# Patient Record
Sex: FEMALE | Race: WHITE | NOT HISPANIC OR LATINO
[De-identification: names, ages, dates, MRNs, and addresses within clinical notes are randomized per-mention and may not be internally consistent; named-entity substitution may affect disease eponyms.]

---

## 2020-11-23 DIAGNOSIS — Z01.818 ENCOUNTER FOR OTHER PREPROCEDURAL EXAMINATION: ICD-10-CM

## 2020-11-23 PROBLEM — Z00.00 ENCOUNTER FOR PREVENTIVE HEALTH EXAMINATION: Status: ACTIVE | Noted: 2020-11-23

## 2020-11-27 ENCOUNTER — APPOINTMENT (OUTPATIENT)
Dept: DISASTER EMERGENCY | Facility: CLINIC | Age: 65
End: 2020-11-27

## 2020-11-28 LAB — SARS-COV-2 N GENE NPH QL NAA+PROBE: NOT DETECTED

## 2024-05-20 ENCOUNTER — APPOINTMENT (OUTPATIENT)
Dept: ORTHOPEDIC SURGERY | Facility: CLINIC | Age: 69
End: 2024-05-20
Payer: MEDICARE

## 2024-05-20 VITALS — WEIGHT: 180 LBS | BODY MASS INDEX: 33.13 KG/M2 | HEIGHT: 62 IN

## 2024-05-20 DIAGNOSIS — I10 ESSENTIAL (PRIMARY) HYPERTENSION: ICD-10-CM

## 2024-05-20 DIAGNOSIS — Z78.9 OTHER SPECIFIED HEALTH STATUS: ICD-10-CM

## 2024-05-20 PROCEDURE — 99204 OFFICE O/P NEW MOD 45 MIN: CPT

## 2024-05-20 PROCEDURE — 72100 X-RAY EXAM L-S SPINE 2/3 VWS: CPT

## 2024-05-20 RX ORDER — SPIRONOLACTONE 25 MG/1
25 TABLET ORAL
Refills: 0 | Status: ACTIVE | COMMUNITY

## 2024-05-20 RX ORDER — MULTIVIT-MIN/FA/LYCOPEN/LUTEIN .4-300-25
TABLET ORAL
Refills: 0 | Status: ACTIVE | COMMUNITY

## 2024-05-20 RX ORDER — TELMISARTAN 80 MG/1
80 TABLET ORAL
Refills: 0 | Status: ACTIVE | COMMUNITY

## 2024-05-20 RX ORDER — AMLODIPINE BESYLATE 10 MG/1
10 TABLET ORAL
Refills: 0 | Status: ACTIVE | COMMUNITY

## 2024-05-21 NOTE — DISCUSSION/SUMMARY
[de-identified] : 68-year-old female has chronic low back pain attributed to grade 1-2 spondylolisthesis at the L4/5 level. Today's X-ray findings were reviewed. The initial treatment plan involves starting physical therapy to focus on strengthening, stretching, and improving range of motion. Additionally, she will follow up with pain management for possible interventional injections. An updated MRI of the lumbar spine has been requested since her last study was conducted four years ago; this new MRI is necessary before proceeding with pain management injections. If her symptoms persist despite conservative care, she may be considered a candidate for surgical options such as laminectomy/fusion as a last resort. A follow-up appointment is scheduled in six weeks. Cumulative encounter duration exceeded 45 minutes.  Prior to appointment and during encounter with patient extensive medical records were reviewed including but not limited to, hospital records, outpatient records, imaging results, and lab data. During this appointment the patient was examined, diagnoses were discussed and explained in a face to face manner. In addition extensive time was spent reviewing aforementioned diagnostic studies. Counseling including abnormal image results, differential diagnoses, treatment options, risk and benefits, lifestyle changes, current condition, and current medications was performed. Patient's comments, questions, and concerns were addressed and patient verbalized understanding. Based on this patient's presentation at our office, which is an orthopedic spine surgeon's office, this patient inherently / intrinsically has a risk, however minute, of developing issues such as Cauda equina syndrome, bowel and bladder changes, or progression of motor or neurological deficits such as paralysis which may be permanent.  NARA YOUSSEF Acting as a Scribe for Dr. Jelani MCMAHON, Nara Youssef, attest that this documentation has been prepared under the direction and in the presence of Provider Bebo Palomares MD.

## 2024-05-21 NOTE — HISTORY OF PRESENT ILLNESS
[10] : 10 [2] : 2 [Shooting] : shooting [Heat] : heat [Walking] : walking [Retired] : Work status: retired [de-identified] : 05/20/2024 - 68-year-old female presenting for initial assessment of long-standing lower back pain, last checked in 2020, with no leg pain reported. Her back pain intensifies when she stands or walks and has been worsening gradually over the past few years, without recent treatments. She's been coping by stretching and using a stationary bike as much as she can. However, she's noticed that her back pain has started to limit her activities, like needing a shopping cart at the store, though she still considers herself functional. Last mri 4 yrs old. Outside of high BP, no abnormalities reported to general medical health.  [] : no [FreeTextEntry9] : advil, aleve [de-identified] : orthopedic, pain mgmt  [de-identified] : mri l-spine done at [facility]

## 2024-05-30 ENCOUNTER — APPOINTMENT (OUTPATIENT)
Dept: MRI IMAGING | Facility: CLINIC | Age: 69
End: 2024-05-30
Payer: MEDICARE

## 2024-05-30 PROCEDURE — 72148 MRI LUMBAR SPINE W/O DYE: CPT

## 2024-06-11 ENCOUNTER — APPOINTMENT (OUTPATIENT)
Dept: PAIN MANAGEMENT | Facility: CLINIC | Age: 69
End: 2024-06-11
Payer: MEDICARE

## 2024-06-11 VITALS — WEIGHT: 186 LBS | BODY MASS INDEX: 34.23 KG/M2 | HEIGHT: 62 IN

## 2024-06-11 DIAGNOSIS — M43.16 SPONDYLOLISTHESIS, LUMBAR REGION: ICD-10-CM

## 2024-06-11 DIAGNOSIS — M48.062 SPINAL STENOSIS, LUMBAR REGION WITH NEUROGENIC CLAUDICATION: ICD-10-CM

## 2024-06-11 DIAGNOSIS — M54.16 RADICULOPATHY, LUMBAR REGION: ICD-10-CM

## 2024-06-11 PROCEDURE — 99204 OFFICE O/P NEW MOD 45 MIN: CPT

## 2024-06-11 NOTE — PHYSICAL EXAM
[de-identified] : Constitutional:   - No acute distress   - Well developed; well nourished    Neurological:   - normal mood and affect   - alert and oriented x 3     Cardiovascular:   - grossly normal   Lumbar Spine Exam:   Inspection: erythema (-) ecchymosis (-) rashes (-) alignment: no scoliosis   Palpation: Midline lumbar tenderness:            (-) midline thoracic tenderness:          (-) Lumbar paraspinal tenderness:  L (+) ; R (+) thoracic paraspinal tenderness: L (-) ; R (-) sciatic nerve tenderness :          L (-) ; R (-) SI joint tenderness:                     L (-) ; R (-) GTB tenderness:                        L (-);  R (-)   ROM: WNL no pain throughout ROM testing   Strength:                                    Right       Left    Hip Flexion:                (5/5)       (5/5) Quadriceps:               (5/5)       (5/5) Hamstrings:                (5/5)       (5/5) Ankle Dorsiflexion:     (5/5)       (5/5) EHL:                           (5/5)       (5/5) Ankle Plantarflexion:  (5/5)       (5/5)   Special Tests: SLR:                            R (=) ; L (=) Facet loading:             R (-) ; L (-) JACKELYN test:                R (N/A) ; L (N/A) Hamstring tightness:   R (-);  L (-)   Neurologic: SILT throughout right lower extremity SILT throughout left lower extremity   Reflexes normal and symmetric bilateral lower extremities   Gait: non- antalgic gait ambulates without assistive device

## 2024-06-11 NOTE — ASSESSMENT
[FreeTextEntry1] : A discussion regarding available pain management treatment options occurred with the patient.  These included interventional, rehabilitative, pharmacological, and alternative modalities. We will proceed with the following:    Interventional treatment options:   - Proceed with bilateral L5-S1 TFESI with fluoroscopic guidance - see additional instructions below    Rehabilitative options: - continue physical therapy   - participation in active HEP was discussed    Medication based treatment options:   - Continue Tylenol 500-1000 mg up to TID as needed - continue OTC NSAIDs on as-needed basis - Advised patient she discuss ongoing use of NSAID therapy with her PCP - see additional instructions below    Complementary treatment options:   - Weight management and lifestyle modifications discussed   Additional treatment recommendations as follows:   - patient will follow-up with Dr. Palomares as directed - MRI findings consistent with left renal atrophy discussed with patient; advised to follow-up with PCP for further testing if needed - Follow up 1-2 weeks post injection for assessment of efficacy and further treatment recommendations  The risks, benefits and alternatives of the proposed procedure were explained in detail with the patient. The risks outlined include but are not limited to infection, bleeding, post- dural puncture headache, nerve injury, a temporary increase in pain, failure to resolve symptoms, need for future interventions, allergic reaction, and possible elevation of blood sugar in diabetics if using corticosteroid.  All questions were answered to patient's apparent satisfaction, and he/she verbalized an understanding.  We have discussed the risks, benefits, and alternatives for NSAID therapy including but not limited to the risk of bleeding, thrombosis, gastric mucosal irritation/ulceration, allergic reaction and kidney dysfunction.  The patient verbalizes an understanding.  The documentation recorded by the scribe, in my presence, accurately reflects the service I personally performed and the decisions made by me with my edits as appropriate.   I, Emigdio Conn acting as scribe, attest that this documentation has been prepared under the direction and in the presence of Provider Paramjit Webber DO.

## 2024-06-11 NOTE — HISTORY OF PRESENT ILLNESS
[Lower back] : lower back [10] : 10 [2] : 2 [Shooting] : shooting [Heat] : heat [Walking] : walking [Retired] : Work status: retired [FreeTextEntry1] : The patient presents for initial evaluation regarding their low back pain.  She was previously seen almost 4 years ago with a similar pain complaint.  Patient complains of pain that is focal across the lower back without any radiation down the legs per se.  Pain is exacerbated with prolonged walking and standing, and she has minimal pain while seated; positive shopping cart sign. Patient has had an epidural in the past which helped alleviate her pain, she also takes Aleve PRN for pain management.  She just started physical therapy yesterday.   Subjective weakness: No  Lower extremity paresthesias: No  Bladder/bowel dysfunction: No   Injections:  1) Bilateral L4-5 TFESI (2020)  Pertinent Surgical History: N/A   Imagin) MRI Lumbar Spine (2024) - [facility]    Physician Disclaimer: I have personally reviewed and confirmed all HPI data with the patient.  [] : no [FreeTextEntry9] : advil, aleve [de-identified] : orthopedic, pain mgmt  [de-identified] : mri l-spine done at [facility]

## 2024-06-17 ENCOUNTER — APPOINTMENT (OUTPATIENT)
Dept: ORTHOPEDIC SURGERY | Facility: CLINIC | Age: 69
End: 2024-06-17

## 2024-07-17 ENCOUNTER — APPOINTMENT (OUTPATIENT)
Dept: PAIN MANAGEMENT | Facility: CLINIC | Age: 69
End: 2024-07-17
Payer: MEDICARE

## 2024-07-17 DIAGNOSIS — M54.16 RADICULOPATHY, LUMBAR REGION: ICD-10-CM

## 2024-07-17 PROCEDURE — 64483 NJX AA&/STRD TFRM EPI L/S 1: CPT | Mod: RT

## 2024-07-30 ENCOUNTER — APPOINTMENT (OUTPATIENT)
Dept: PAIN MANAGEMENT | Facility: CLINIC | Age: 69
End: 2024-07-30
Payer: MEDICARE

## 2024-07-30 VITALS — BODY MASS INDEX: 34.23 KG/M2 | HEIGHT: 62 IN | WEIGHT: 186 LBS

## 2024-07-30 DIAGNOSIS — M48.062 SPINAL STENOSIS, LUMBAR REGION WITH NEUROGENIC CLAUDICATION: ICD-10-CM

## 2024-07-30 DIAGNOSIS — M54.16 RADICULOPATHY, LUMBAR REGION: ICD-10-CM

## 2024-07-30 DIAGNOSIS — M43.16 SPONDYLOLISTHESIS, LUMBAR REGION: ICD-10-CM

## 2024-07-30 PROCEDURE — 99214 OFFICE O/P EST MOD 30 MIN: CPT

## 2024-07-30 NOTE — ASSESSMENT
[FreeTextEntry1] : A discussion regarding available pain management treatment options occurred with the patient.  These included interventional, rehabilitative, pharmacological, and alternative modalities. We will proceed with the following:    Interventional treatment options:   - Will proceed with repeat bilateral L5-S1 TFESI with return of severe low back pain-will call - see additional instructions below    Rehabilitative options: - Completed prior physical therapy - participation in active HEP was discussed and encouraged as tolerated - Home exercise sheet provided  Medication based treatment options:   - Continue Tylenol 500-1000 mg up to TID as needed - continue OTC NSAIDs on as-needed basis - Advised patient she discuss ongoing use of NSAID therapy with her PCP - see additional instructions below    Complementary treatment options:   - Weight management and lifestyle modifications discussed   Additional treatment recommendations as follows:   - patient will follow-up with Dr. Palomares as directed - MRI findings consistent with left renal atrophy discussed with patient at prior visit; she has discussed this with her PCP - Follow up for indicated procedure or as-needed basis  The risks, benefits and alternatives of the proposed procedure were explained in detail with the patient. The risks outlined include but are not limited to infection, bleeding, post- dural puncture headache, nerve injury, a temporary increase in pain, failure to resolve symptoms, need for future interventions, allergic reaction, and possible elevation of blood sugar in diabetics if using corticosteroid.  All questions were answered to patient's apparent satisfaction, and he/she verbalized an understanding.  We have discussed the risks, benefits, and alternatives for NSAID therapy including but not limited to the risk of bleeding, thrombosis, gastric mucosal irritation/ulceration, allergic reaction and kidney dysfunction.  The patient verbalizes an understanding.  The documentation recorded by the scribe, in my presence, accurately reflects the service I personally performed and the decisions made by me with my edits as appropriate.   I, Emigdio Conn acting as scribe, attest that this documentation has been prepared under the direction and in the presence of Provider Paramjit Webber DO.

## 2024-07-30 NOTE — HISTORY OF PRESENT ILLNESS
[Lower back] : lower back [10] : 10 [2] : 2 [Shooting] : shooting [Heat] : heat [Walking] : walking [Retired] : Work status: retired [FreeTextEntry1] : 2024 - Patient presents for FUV after a bilateral L5-S1 TFESI on 2024. Patient reports a 90% overall reduction of pain s/p epidural, her walking tolerance has improved, she is able to perform ADL with a greater degree of comfort and is happy with the results of the procedure.  2024 - The patient presents for initial evaluation regarding their low back pain.  She was previously seen almost 4 years ago with a similar pain complaint.  Patient complains of pain that is focal across the lower back without any radiation down the legs per se.  Pain is exacerbated with prolonged walking and standing, and she has minimal pain while seated, positive shopping cart sign. Patient has had an epidural in the past which helped alleviate her pain, she also takes Aleve PRN for pain management.  She just started physical therapy yesterday.   Injections:  1) Bilateral L4-5 TFESI (2020) 2) Bilateral L5-S1 TFESI (2024)  Pertinent Surgical History: N/A   Imagin) MRI Lumbar Spine (2024) - [facility]    Physician Disclaimer: I have personally reviewed and confirmed all HPI data with the patient.  [] : no [FreeTextEntry9] : advil, aleve [de-identified] : orthopedic, pain mgmt  [de-identified] : mri l-spine done at [facility]

## 2024-07-30 NOTE — PHYSICAL EXAM
[de-identified] : Constitutional:   - No acute distress   - Well developed; well nourished    Neurological:   - normal mood and affect   - alert and oriented x 3     Cardiovascular:   - grossly normal   Lumbar Spine Exam:   Inspection: erythema (-) ecchymosis (-) rashes (-) alignment: no scoliosis   Palpation: Midline lumbar tenderness:            (-) midline thoracic tenderness:          (-) Lumbar paraspinal tenderness:  L (+) ; R (+) thoracic paraspinal tenderness: L (-) ; R (-) sciatic nerve tenderness :          L (-) ; R (-) SI joint tenderness:                     L (-) ; R (-) GTB tenderness:                        L (-);  R (-)   ROM: WNL no pain throughout ROM testing   Strength:                                    Right       Left    Hip Flexion:                (5/5)       (5/5) Quadriceps:               (5/5)       (5/5) Hamstrings:                (5/5)       (5/5) Ankle Dorsiflexion:     (5/5)       (5/5) EHL:                           (5/5)       (5/5) Ankle Plantarflexion:  (5/5)       (5/5)   Special Tests: SLR:                            R (=) ; L (=) Facet loading:             R (-) ; L (-) JACKELYN test:                R (N/A) ; L (N/A) Hamstring tightness:   R (-);  L (-)   Neurologic: SILT throughout right lower extremity SILT throughout left lower extremity   Reflexes normal and symmetric bilateral lower extremities   Gait: non- antalgic gait ambulates without assistive device